# Patient Record
Sex: MALE | Race: WHITE | NOT HISPANIC OR LATINO | URBAN - METROPOLITAN AREA
[De-identification: names, ages, dates, MRNs, and addresses within clinical notes are randomized per-mention and may not be internally consistent; named-entity substitution may affect disease eponyms.]

---

## 2024-10-07 ENCOUNTER — APPOINTMENT (OUTPATIENT)
Dept: SURGERY | Facility: CLINIC | Age: 64
End: 2024-10-07
Payer: COMMERCIAL

## 2024-10-07 DIAGNOSIS — K66.0 INTRA-ABDOMINAL ADHESIONS: Primary | ICD-10-CM

## 2024-10-07 PROCEDURE — 99024 POSTOP FOLLOW-UP VISIT: CPT | Performed by: PHYSICIAN ASSISTANT

## 2024-10-07 NOTE — PROGRESS NOTES
Subjective   Patient ID: Kj Lott is a 64 y.o. male who presents for status post laparoscopic lysis of adhesions      HPI  This is a 64-year-old gentleman who was admitted to [facility] with abdominal pain diagnosed with a internal hernia volvulus and strangulation partial small bowel obstruction.  Patient underwent a laparoscopic lysis of adhesions on 9/17/2024.  Patient is here first postoperative.  Patient is doing well.  No abdominal pain no nausea no vomiting no diarrhea he is back to exercising and doing well    Review of Systems  Review of systems is negative other than what is mentioned above        Physical Exam  Incisions are clean dry and intact no erythema no swelling no drainage.  Abdomen is soft nontender bowel sounds are present    Objective     No diagnosis found.   There is no problem list on file for this patient.     Not on File   Medication Documentation Review Audit    **Prior to Admission medications have not yet been reviewed**         Past Medical History:   Diagnosis Date    Essential (primary) hypertension 08/23/2013    Benign essential hypertension     Social History     Tobacco Use   Smoking Status Not on file   Smokeless Tobacco Not on file     No family history on file.   No past surgical history on file.    Assessment/Plan   No lifting over 10 to 12 pounds for 4 weeks  You may ride a stationary bike, you may use a treadmill, you may walk outside.  No squats, sit ups or lunges or core exercises for 4 weeks   You may drive a car  Follow-up as needed        Lesley Díaz PA-C